# Patient Record
Sex: MALE | Race: WHITE | NOT HISPANIC OR LATINO | Employment: FULL TIME | URBAN - METROPOLITAN AREA
[De-identification: names, ages, dates, MRNs, and addresses within clinical notes are randomized per-mention and may not be internally consistent; named-entity substitution may affect disease eponyms.]

---

## 2021-09-07 ENCOUNTER — OCCUPATIONAL HEALTH (OUTPATIENT)
Dept: URGENT CARE | Facility: CLINIC | Age: 41
End: 2021-09-07

## 2021-09-07 DIAGNOSIS — J02.9 SORE THROAT: ICD-10-CM

## 2021-09-07 DIAGNOSIS — J02.9 SORE THROAT: Primary | ICD-10-CM

## 2021-09-07 LAB
CTP QC/QA: YES
SARS-COV-2 RDRP RESP QL NAA+PROBE: NEGATIVE

## 2021-09-07 PROCEDURE — U0002 COVID-19 LAB TEST NON-CDC: HCPCS | Mod: QW,S$GLB,, | Performed by: PREVENTIVE MEDICINE

## 2021-09-07 PROCEDURE — U0002: ICD-10-PCS | Mod: QW,S$GLB,, | Performed by: PREVENTIVE MEDICINE

## 2021-10-04 ENCOUNTER — IMMUNIZATION (OUTPATIENT)
Dept: PRIMARY CARE CLINIC | Facility: CLINIC | Age: 41
End: 2021-10-04
Payer: OTHER GOVERNMENT

## 2021-10-04 DIAGNOSIS — Z23 NEED FOR VACCINATION: Primary | ICD-10-CM

## 2021-10-04 PROCEDURE — 91300 COVID-19, MRNA, LNP-S, PF, 30 MCG/0.3 ML DOSE VACCINE: CPT | Mod: S$GLB,,, | Performed by: FAMILY MEDICINE

## 2021-10-04 PROCEDURE — 0001A COVID-19, MRNA, LNP-S, PF, 30 MCG/0.3 ML DOSE VACCINE: ICD-10-PCS | Mod: S$GLB,,, | Performed by: FAMILY MEDICINE

## 2021-10-04 PROCEDURE — 91300 COVID-19, MRNA, LNP-S, PF, 30 MCG/0.3 ML DOSE VACCINE: ICD-10-PCS | Mod: S$GLB,,, | Performed by: FAMILY MEDICINE

## 2021-10-04 PROCEDURE — 0001A COVID-19, MRNA, LNP-S, PF, 30 MCG/0.3 ML DOSE VACCINE: CPT | Mod: S$GLB,,, | Performed by: FAMILY MEDICINE

## 2021-10-25 ENCOUNTER — IMMUNIZATION (OUTPATIENT)
Dept: PRIMARY CARE CLINIC | Facility: CLINIC | Age: 41
End: 2021-10-25
Payer: OTHER GOVERNMENT

## 2021-10-25 DIAGNOSIS — Z23 NEED FOR VACCINATION: Primary | ICD-10-CM

## 2021-10-25 PROCEDURE — 91300 COVID-19, MRNA, LNP-S, PF, 30 MCG/0.3 ML DOSE VACCINE: CPT | Mod: S$GLB,,, | Performed by: FAMILY MEDICINE

## 2021-10-25 PROCEDURE — 0002A COVID-19, MRNA, LNP-S, PF, 30 MCG/0.3 ML DOSE VACCINE: CPT | Mod: S$GLB,,, | Performed by: FAMILY MEDICINE

## 2021-10-25 PROCEDURE — 0002A COVID-19, MRNA, LNP-S, PF, 30 MCG/0.3 ML DOSE VACCINE: ICD-10-PCS | Mod: S$GLB,,, | Performed by: FAMILY MEDICINE

## 2021-10-25 PROCEDURE — 91300 COVID-19, MRNA, LNP-S, PF, 30 MCG/0.3 ML DOSE VACCINE: ICD-10-PCS | Mod: S$GLB,,, | Performed by: FAMILY MEDICINE

## 2021-12-28 ENCOUNTER — OCCUPATIONAL HEALTH (OUTPATIENT)
Dept: URGENT CARE | Facility: CLINIC | Age: 41
End: 2021-12-28
Payer: OTHER GOVERNMENT

## 2021-12-28 DIAGNOSIS — R09.81 SINUS CONGESTION: ICD-10-CM

## 2021-12-28 DIAGNOSIS — R68.83 CHILLS: ICD-10-CM

## 2021-12-28 DIAGNOSIS — R53.83 FATIGUE, UNSPECIFIED TYPE: ICD-10-CM

## 2021-12-28 DIAGNOSIS — R09.89 RUNNY NOSE: Primary | ICD-10-CM

## 2021-12-28 DIAGNOSIS — R09.89 RUNNY NOSE: ICD-10-CM

## 2021-12-28 DIAGNOSIS — R50.9 FEVER, UNSPECIFIED FEVER CAUSE: ICD-10-CM

## 2021-12-28 LAB
CTP QC/QA: YES
SARS-COV-2 RDRP RESP QL NAA+PROBE: NEGATIVE

## 2021-12-28 PROCEDURE — U0002: ICD-10-PCS | Mod: QW,S$GLB,, | Performed by: PREVENTIVE MEDICINE

## 2021-12-28 PROCEDURE — U0002 COVID-19 LAB TEST NON-CDC: HCPCS | Mod: QW,S$GLB,, | Performed by: PREVENTIVE MEDICINE

## 2022-02-23 ENCOUNTER — DOCUMENTATION ONLY (OUTPATIENT)
Dept: ADMINISTRATIVE | Facility: HOSPITAL | Age: 42
End: 2022-02-23
Payer: OTHER GOVERNMENT

## 2022-02-23 LAB
CTP QC/QA: YES
SARS-COV-2 AG RESP QL IA.RAPID: NEGATIVE

## 2022-07-06 ENCOUNTER — DOCUMENTATION ONLY (OUTPATIENT)
Dept: FAMILY MEDICINE | Facility: CLINIC | Age: 42
End: 2022-07-06

## 2022-07-06 DIAGNOSIS — R11.0 NAUSEA: Primary | ICD-10-CM

## 2022-07-06 LAB
CTP QC/QA: YES
SARS-COV-2 AG RESP QL IA.RAPID: POSITIVE

## 2022-11-29 ENCOUNTER — DOCUMENTATION ONLY (OUTPATIENT)
Dept: ADMINISTRATIVE | Facility: HOSPITAL | Age: 42
End: 2022-11-29

## 2022-11-29 LAB
CTP QC/QA: YES
SARS-COV-2 AG RESP QL IA.RAPID: NEGATIVE

## 2023-07-19 ENCOUNTER — HOSPITAL ENCOUNTER (EMERGENCY)
Facility: HOSPITAL | Age: 43
Discharge: HOME | End: 2023-07-19
Attending: EMERGENCY MEDICINE
Payer: OTHER GOVERNMENT

## 2023-07-19 VITALS
DIASTOLIC BLOOD PRESSURE: 85 MMHG | HEIGHT: 69 IN | RESPIRATION RATE: 16 BRPM | WEIGHT: 170 LBS | OXYGEN SATURATION: 95 % | TEMPERATURE: 98 F | BODY MASS INDEX: 25.18 KG/M2 | SYSTOLIC BLOOD PRESSURE: 125 MMHG | HEART RATE: 69 BPM

## 2023-07-19 DIAGNOSIS — G51.0 BELL'S PALSY: Primary | ICD-10-CM

## 2023-07-19 DIAGNOSIS — R07.9 CHEST PAIN, UNSPECIFIED TYPE: ICD-10-CM

## 2023-07-19 PROBLEM — R29.810 FACIAL WEAKNESS: Status: ACTIVE | Noted: 2023-07-19

## 2023-07-19 PROBLEM — R29.810 FACIAL WEAKNESS: Status: RESOLVED | Noted: 2023-07-19 | Resolved: 2023-07-19

## 2023-07-19 PROBLEM — R07.89 CHEST TIGHTNESS: Status: ACTIVE | Noted: 2023-07-19

## 2023-07-19 LAB
ALBUMIN SERPL BCP-MCNC: 4.3 G/DL (ref 3.5–5.2)
ALP SERPL-CCNC: 54 U/L (ref 55–135)
ALT SERPL W/O P-5'-P-CCNC: 12 U/L (ref 10–44)
ANION GAP SERPL CALC-SCNC: 8 MMOL/L (ref 8–16)
AST SERPL-CCNC: 14 U/L (ref 10–40)
BASOPHILS # BLD AUTO: 0.05 K/UL (ref 0–0.2)
BASOPHILS NFR BLD: 0.6 % (ref 0–1.9)
BILIRUB SERPL-MCNC: 0.6 MG/DL (ref 0.1–1)
BNP SERPL-MCNC: 18 PG/ML (ref 0–99)
BUN SERPL-MCNC: 17 MG/DL (ref 6–20)
CALCIUM SERPL-MCNC: 8.8 MG/DL (ref 8.7–10.5)
CHLORIDE SERPL-SCNC: 106 MMOL/L (ref 95–110)
CHOLEST SERPL-MCNC: 175 MG/DL (ref 120–199)
CHOLEST/HDLC SERPL: 3.1 {RATIO} (ref 2–5)
CO2 SERPL-SCNC: 27 MMOL/L (ref 23–29)
CREAT SERPL-MCNC: 1.3 MG/DL (ref 0.5–1.4)
CREAT SERPL-MCNC: 1.4 MG/DL (ref 0.5–1.4)
DIFFERENTIAL METHOD: ABNORMAL
EOSINOPHIL # BLD AUTO: 0.2 K/UL (ref 0–0.5)
EOSINOPHIL NFR BLD: 2.1 % (ref 0–8)
ERYTHROCYTE [DISTWIDTH] IN BLOOD BY AUTOMATED COUNT: 13.3 % (ref 11.5–14.5)
EST. GFR  (NO RACE VARIABLE): >60 ML/MIN/1.73 M^2
GLUCOSE SERPL-MCNC: 87 MG/DL (ref 70–110)
GLUCOSE SERPL-MCNC: 96 MG/DL (ref 70–110)
HCT VFR BLD AUTO: 41.1 % (ref 40–54)
HDLC SERPL-MCNC: 56 MG/DL (ref 40–75)
HDLC SERPL: 32 % (ref 20–50)
HGB BLD-MCNC: 13.3 G/DL (ref 14–18)
IMM GRANULOCYTES # BLD AUTO: 0.02 K/UL (ref 0–0.04)
IMM GRANULOCYTES NFR BLD AUTO: 0.2 % (ref 0–0.5)
INR PPP: 0.9 (ref 0.8–1.2)
LDLC SERPL CALC-MCNC: 60 MG/DL (ref 63–159)
LYMPHOCYTES # BLD AUTO: 2.7 K/UL (ref 1–4.8)
LYMPHOCYTES NFR BLD: 33.5 % (ref 18–48)
MCH RBC QN AUTO: 27.9 PG (ref 27–31)
MCHC RBC AUTO-ENTMCNC: 32.4 G/DL (ref 32–36)
MCV RBC AUTO: 86 FL (ref 82–98)
MONOCYTES # BLD AUTO: 0.6 K/UL (ref 0.3–1)
MONOCYTES NFR BLD: 7.4 % (ref 4–15)
NEUTROPHILS # BLD AUTO: 4.6 K/UL (ref 1.8–7.7)
NEUTROPHILS NFR BLD: 56.2 % (ref 38–73)
NONHDLC SERPL-MCNC: 119 MG/DL
NRBC BLD-RTO: 0 /100 WBC
PLATELET # BLD AUTO: 242 K/UL (ref 150–450)
PMV BLD AUTO: 10.9 FL (ref 9.2–12.9)
POTASSIUM SERPL-SCNC: 3.7 MMOL/L (ref 3.5–5.1)
PROT SERPL-MCNC: 7.1 G/DL (ref 6–8.4)
PROTHROMBIN TIME: 10.1 SEC (ref 9–12.5)
RBC # BLD AUTO: 4.76 M/UL (ref 4.6–6.2)
SAMPLE: NORMAL
SODIUM SERPL-SCNC: 141 MMOL/L (ref 136–145)
TRIGL SERPL-MCNC: 295 MG/DL (ref 30–150)
TROPONIN I SERPL HS-MCNC: <2.3 PG/ML (ref 0–14.9)
TROPONIN I SERPL HS-MCNC: <2.3 PG/ML (ref 0–14.9)
TSH SERPL DL<=0.005 MIU/L-ACNC: 2.17 UIU/ML (ref 0.34–5.6)
WBC # BLD AUTO: 8.12 K/UL (ref 3.9–12.7)

## 2023-07-19 PROCEDURE — 85025 COMPLETE CBC W/AUTO DIFF WBC: CPT | Performed by: EMERGENCY MEDICINE

## 2023-07-19 PROCEDURE — 93010 ELECTROCARDIOGRAM REPORT: CPT | Mod: ,,, | Performed by: INTERNAL MEDICINE

## 2023-07-19 PROCEDURE — 83880 ASSAY OF NATRIURETIC PEPTIDE: CPT | Performed by: EMERGENCY MEDICINE

## 2023-07-19 PROCEDURE — 93005 ELECTROCARDIOGRAM TRACING: CPT | Performed by: INTERNAL MEDICINE

## 2023-07-19 PROCEDURE — 84443 ASSAY THYROID STIM HORMONE: CPT | Performed by: EMERGENCY MEDICINE

## 2023-07-19 PROCEDURE — 25500020 PHARM REV CODE 255: Performed by: EMERGENCY MEDICINE

## 2023-07-19 PROCEDURE — 84484 ASSAY OF TROPONIN QUANT: CPT | Performed by: EMERGENCY MEDICINE

## 2023-07-19 PROCEDURE — 82962 GLUCOSE BLOOD TEST: CPT

## 2023-07-19 PROCEDURE — 63600175 PHARM REV CODE 636 W HCPCS: Performed by: EMERGENCY MEDICINE

## 2023-07-19 PROCEDURE — 80053 COMPREHEN METABOLIC PANEL: CPT | Performed by: EMERGENCY MEDICINE

## 2023-07-19 PROCEDURE — 25000003 PHARM REV CODE 250: Performed by: STUDENT IN AN ORGANIZED HEALTH CARE EDUCATION/TRAINING PROGRAM

## 2023-07-19 PROCEDURE — G0408 PR TELHEALTH INPT CONSULT 35MIN: ICD-10-PCS | Mod: GT,,, | Performed by: STUDENT IN AN ORGANIZED HEALTH CARE EDUCATION/TRAINING PROGRAM

## 2023-07-19 PROCEDURE — 80061 LIPID PANEL: CPT | Performed by: EMERGENCY MEDICINE

## 2023-07-19 PROCEDURE — 93010 EKG 12-LEAD: ICD-10-PCS | Mod: ,,, | Performed by: INTERNAL MEDICINE

## 2023-07-19 PROCEDURE — 93005 ELECTROCARDIOGRAM TRACING: CPT | Performed by: SPECIALIST

## 2023-07-19 PROCEDURE — 25000003 PHARM REV CODE 250: Performed by: EMERGENCY MEDICINE

## 2023-07-19 PROCEDURE — 99285 EMERGENCY DEPT VISIT HI MDM: CPT | Mod: 25

## 2023-07-19 PROCEDURE — 85610 PROTHROMBIN TIME: CPT | Performed by: EMERGENCY MEDICINE

## 2023-07-19 PROCEDURE — G0408 INPT/TELE FOLLOW UP 35: HCPCS | Mod: GT,,, | Performed by: STUDENT IN AN ORGANIZED HEALTH CARE EDUCATION/TRAINING PROGRAM

## 2023-07-19 RX ORDER — METHYLPREDNISOLONE 4 MG/1
TABLET ORAL
Qty: 21 EACH | Refills: 0 | Status: SHIPPED | OUTPATIENT
Start: 2023-07-19 | End: 2023-08-09

## 2023-07-19 RX ORDER — VALACYCLOVIR HYDROCHLORIDE 1 G/1
1000 TABLET, FILM COATED ORAL 3 TIMES DAILY
Qty: 21 TABLET | Refills: 0 | Status: SHIPPED | OUTPATIENT
Start: 2023-07-19 | End: 2023-07-26

## 2023-07-19 RX ORDER — ASPIRIN 325 MG
325 TABLET ORAL
Status: COMPLETED | OUTPATIENT
Start: 2023-07-19 | End: 2023-07-19

## 2023-07-19 RX ORDER — VALACYCLOVIR HYDROCHLORIDE 500 MG/1
1000 TABLET, FILM COATED ORAL 3 TIMES DAILY
Status: DISCONTINUED | OUTPATIENT
Start: 2023-07-19 | End: 2023-07-20 | Stop reason: HOSPADM

## 2023-07-19 RX ORDER — PREDNISONE 20 MG/1
60 TABLET ORAL
Status: COMPLETED | OUTPATIENT
Start: 2023-07-19 | End: 2023-07-19

## 2023-07-19 RX ADMIN — ASPIRIN 325 MG: 325 TABLET ORAL at 09:07

## 2023-07-19 RX ADMIN — VALACYCLOVIR 1000 MG: 500 TABLET, FILM COATED ORAL at 11:07

## 2023-07-19 RX ADMIN — IOHEXOL 100 ML: 350 INJECTION, SOLUTION INTRAVENOUS at 07:07

## 2023-07-19 RX ADMIN — PREDNISONE 60 MG: 20 TABLET ORAL at 09:07

## 2023-07-20 NOTE — HPI
Patient is a 43-year-old male with no significant past medical history who presents with left-sided facial droop and dysarthria.  Symptoms began suddenly earlier this evening 30 minutes prior to ED arrival.  Did initially have some associated chest tightness.  Denies shortness of breath.  Denies headaches, lightheadedness, changes in vision, and any weakness.  Denies any history of strokes or MIs.  No family history of strokes.  No fevers or chills.  CBC and CMP unremarkable.  Troponins x2 are negative.  EKG without ST elevations.  MRI brain no acute findings.  Neurology consulted and believes symptoms are due to Bell's palsy.  Patient given Valtrex and prednisone.  On my examination, chest tightness has resolved.  Symptoms have now stabilized and patient feels well enough to go home.  Patient feels comfortable with following up outpatient with Neurology.

## 2023-07-20 NOTE — CONSULTS
[facility] - Emergency Dept  Hospital Medicine  Consult Note    Patient Name: Daryl Tsai  MRN: [id number]  Admission Date: 7/19/2023  Hospital Length of Stay: 0 days  Attending Physician: No att. providers found   Primary Care Provider: [facility]           Patient information was obtained from patient and ER records.     Consults  Subjective:     Principal Problem: Bell's palsy    Chief Complaint:   Chief Complaint   Patient presents with    Chest Pain    Facial Droop    Cerebrovascular Accident        HPI: Patient is a 43-year-old male with no significant past medical history who presents with left-sided facial droop and dysarthria.  Symptoms began suddenly earlier this evening 30 minutes prior to ED arrival.  Did initially have some associated chest tightness.  Denies shortness of breath.  Denies headaches, lightheadedness, changes in vision, and any weakness.  Denies any history of strokes or MIs.  No family history of strokes.  No fevers or chills.  CBC and CMP unremarkable.  Troponins x2 are negative.  EKG without ST elevations.  MRI brain no acute findings.  Neurology consulted and believes symptoms are due to Bell's palsy.  Patient given Valtrex and prednisone.  On my examination, chest tightness has resolved.  Symptoms have now stabilized and patient feels well enough to go home.  Patient feels comfortable with following up outpatient with Neurology.      No past medical history on file.    No past surgical history on file.    Review of patient's allergies indicates:  No Known Allergies    No current facility-administered medications on file prior to encounter.     No current outpatient medications on file prior to encounter.     Family History    None       Tobacco Use    Smoking status: Not on file    Smokeless tobacco: Not on file   Substance and Sexual Activity    Alcohol use: Not on file    Drug use: Not on file    Sexual activity: Not on file     Review of  Systems   Constitutional: Negative.    HENT: Negative.     Eyes: Negative.    Respiratory: Negative.     Cardiovascular:  Negative for palpitations and leg swelling.        Chest tightness   Gastrointestinal: Negative.    Endocrine: Negative.    Genitourinary: Negative.    Musculoskeletal: Negative.    Skin: Negative.    Neurological:  Positive for facial asymmetry and speech difficulty. Negative for dizziness, tremors, seizures, syncope, weakness, light-headedness, numbness and headaches.   Hematological: Negative.    Psychiatric/Behavioral: Negative.     Objective:     Vital Signs (Most Recent):  Temp: 97.9 °F (36.6 °C) (07/19/23 1927)  Pulse: 86 (07/19/23 1927)  Resp: 17 (07/19/23 1927)  BP: (!) 170/100 (07/19/23 1927)  SpO2: 97 % (07/19/23 1927) Vital Signs (24h Range):  Temp:  [97.9 °F (36.6 °C)] 97.9 °F (36.6 °C)  Pulse:  [86] 86  Resp:  [17] 17  SpO2:  [97 %] 97 %  BP: (170)/(100) 170/100     Weight: 77.1 kg (170 lb)  Body mass index is 25.1 kg/m².     Physical Exam  Vitals and nursing note reviewed.   Constitutional:       Appearance: Normal appearance.   HENT:      Head: Normocephalic and atraumatic.      Nose: Nose normal.   Eyes:      Extraocular Movements: Extraocular movements intact.      Conjunctiva/sclera: Conjunctivae normal.      Pupils: Pupils are equal, round, and reactive to light.   Cardiovascular:      Rate and Rhythm: Normal rate and regular rhythm.      Heart sounds: No murmur heard.  Pulmonary:      Effort: Pulmonary effort is normal. No respiratory distress.      Breath sounds: Normal breath sounds. No stridor. No wheezing or rales.   Abdominal:      General: Abdomen is flat. There is no distension.      Palpations: Abdomen is soft.      Tenderness: There is no abdominal tenderness.   Musculoskeletal:         General: No swelling, tenderness or deformity. Normal range of motion.      Cervical back: Normal range of motion and neck supple.      Right lower leg: No edema.      Left lower leg:  No edema.   Skin:     General: Skin is warm and dry.      Coloration: Skin is not jaundiced.   Neurological:      Mental Status: He is alert and oriented to person, place, and time.      Sensory: No sensory deficit.      Motor: No weakness.      Coordination: Coordination normal.      Comments: Left facial droop, dysarthria   Psychiatric:         Mood and Affect: Mood normal.         Behavior: Behavior normal.         Thought Content: Thought content normal.         Judgment: Judgment normal.        Significant Labs: All pertinent labs within the past 24 hours have been reviewed.  Recent Lab Results         07/19/23 1918 07/19/23 1911 07/19/23 1908        Albumin   4.3         Alkaline Phosphatase   54         ALT   12         Anion Gap   8         AST   14         Baso #   0.05         Basophil %   0.6         BILIRUBIN TOTAL   0.6  Comment: For infants and newborns, interpretation of results should be based  on gestational age, weight and in agreement with clinical  observations.    Premature Infant recommended reference ranges:  Up to 24 hours.............<8.0 mg/dL  Up to 48 hours............<12.0 mg/dL  3-5 days..................<15.0 mg/dL  6-29 days.................<15.0 mg/dL           BNP   18  Comment: Values of less than 100 pg/ml are consistent with non-CHF populations.         BUN   17         Calcium   8.8         Chloride   106         Cholesterol   175  Comment: The National Cholesterol Education Program (NCEP) has set the  following guidelines (reference ranges) for Cholesterol:  Optimal.....................<200 mg/dL  Borderline High.............200-239 mg/dL  High........................> or = 240 mg/dL           CO2   27         Creatinine   1.3         Differential Method   Automated         eGFR   >60.0         Eos #   0.2         Eosinophil %   2.1         Glucose   96         Gran # (ANC)   4.6         Gran %   56.2         HDL   56  Comment: The National Cholesterol Education Program  (NCEP) has set the  following guidelines (reference values) for HDL Cholesterol:  Low...............<40 mg/dL  Optimal...........>60 mg/dL           HDL/Cholesterol Ratio   32.0         Hematocrit   41.1         Hemoglobin   13.3         Immature Grans (Abs)   0.02  Comment: Mild elevation in immature granulocytes is non specific and   can be seen in a variety of conditions including stress response,   acute inflammation, trauma and pregnancy. Correlation with other   laboratory and clinical findings is essential.           Immature Granulocytes   0.2         INR   0.9  Comment: Coumadin Therapy:  2.0 - 3.0 for INR for all indicators except mechanical heart valves  and antiphospholipid syndromes which should use 2.5 - 3.5.           LDL Cholesterol External   60.0  Comment: The National Cholesterol Education Program (NCEP) has set the  following guidelines (reference values) for LDL Cholesterol:  Optimal.......................<130 mg/dL  Borderline High...............130-159 mg/dL  High..........................160-189 mg/dL  Very High.....................>190 mg/dL           Lymph #   2.7         Lymph %   33.5         MCH   27.9         MCHC   32.4         MCV   86         Mono #   0.6         Mono %   7.4         MPV   10.9         Non-HDL Cholesterol   119  Comment: Risk category and Non-HDL cholesterol goals:  Coronary heart disease (CHD)or equivalent (10-year risk of CHD >20%):  Non-HDL cholesterol goal     <130 mg/dL  Two or more CHD risk factors and 10-year risk of CHD <= 20%:  Non-HDL cholesterol goal     <160 mg/dL  0 to 1 CHD risk factor:  Non-HDL cholesterol goal     <190 mg/dL           nRBC   0         Platelets   242         POC Creatinine     1.4       POC Glucose 87           Potassium   3.7         PROTEIN TOTAL   7.1         Protime   10.1         RBC   4.76         RDW   13.3         Sample     VENOUS       Sodium   141         Total Cholesterol/HDL Ratio   3.1         Triglycerides    295  Comment: The National Cholesterol Education Program (NCEP) has set the  following guidelines (reference values) for triglycerides:  Normal......................<150 mg/dL  Borderline High.............150-199 mg/dL  High........................200-499 mg/dL           Troponin I High Sensitivity   <2.3  Comment: Troponin results differ between methods. Do not use   results between Troponin methods interchangeably.    Alkaline Phospatase levels above 400 U/L may   cause false positive results.    Access hsTnI should not be used for patients taking   Asfotase shaun (Strensiq).           TSH   2.170         WBC   8.12                 Significant Imaging: I have reviewed all pertinent imaging results/findings within the past 24 hours.    Assessment/Plan:     * Bell's palsy  MRI brain no acute findings.  Neurology consulted and believes symptoms are due to Bell's palsy.  Patient given Valtrex and prednisone.  We will discharge patient with Medrol Dosepak and Valtrex for 1 week.  Outpatient neurology referral placed.  Patient advised to return to ED for any new or worsening of symptoms.  Patient is in agreement with plan.      Chest tightness  Chest tightness resolved, troponins x2 are negative, EKG without ST elevations      VTE Risk Mitigation (From admission, onward)    None              Thank you for your consult. I will sign off. Please contact us if you have any additional questions.    Danish Flores MD  Department of Hospital Medicine   Former[facility] - Emergency Dept

## 2023-07-20 NOTE — HPI
42 y/o male with no significant history who presented with acute onset disorientation (describes it as 'out of body experience'), chest pressure, facial numbness on both sided and slurred speech. No weakness or numbness of the extremities. He also states that he had a hard time closing his left eye when EMS evaluated him.  These symptoms have never occurred before.    /110

## 2023-07-20 NOTE — ED PROVIDER NOTES
Encounter Date: 7/19/2023       History     Chief Complaint   Patient presents with    Chest Pain    Facial Droop    Cerebrovascular Accident     43-year-old male presented emergency department with left facial droop and slurred speech and diffuse facial tingling and left sided chest pain which started 30 minutes prior to arrival.  Patient said patient felt like he could not close his left eye when EMS arrived to the house and by the time patient came here his left facial droop significantly improved and can close the left eye at this time.  Patient's chest pain also resolved.  Patient denies fever or chills or nausea vomiting or dysuria or hematuria.  Patient has no prior history of hypertension or stroke or heart disease.    Review of patient's allergies indicates:  No Known Allergies  No past medical history on file.  No past surgical history on file.  No family history on file.     Review of Systems   Constitutional: Negative.    HENT: Negative.     Eyes: Negative.    Respiratory: Negative.     Cardiovascular:  Positive for chest pain.   Gastrointestinal: Negative.    Endocrine: Negative.    Genitourinary: Negative.    Musculoskeletal: Negative.    Skin: Negative.    Allergic/Immunologic: Negative.    Neurological:  Positive for facial asymmetry and speech difficulty.   Hematological: Negative.    Psychiatric/Behavioral: Negative.     All other systems reviewed and are negative.    Physical Exam     Initial Vitals [07/19/23 1927]   BP Pulse Resp Temp SpO2   (!) 170/100 86 17 97.9 °F (36.6 °C) 97 %      MAP       --         Physical Exam    Nursing note and vitals reviewed.  Constitutional: He appears well-developed and well-nourished.   HENT:   Head: Normocephalic and atraumatic.   Nose: Nose normal.   Eyes: Conjunctivae and EOM are normal.   Neck: No tracheal deviation present.   Normal range of motion.  Cardiovascular:  Normal rate, regular rhythm, normal heart sounds and intact distal pulses.     Exam reveals  no friction rub.       No murmur heard.  Pulmonary/Chest: Breath sounds normal. No respiratory distress. He has no wheezes. He has no rales.   Abdominal: Abdomen is soft. He exhibits no distension. There is no abdominal tenderness.   Musculoskeletal:         General: Normal range of motion.      Cervical back: Normal range of motion.     Neurological: He is alert and oriented to person, place, and time. He has normal strength. He displays normal reflexes. No sensory deficit. GCS score is 15. GCS eye subscore is 4. GCS verbal subscore is 5. GCS motor subscore is 6.   Very mild asymmetry noted on the face with slightly different on both sides of the face.  Patient's speech improved as well.  Left facial lower motor neuron weakness noted   Skin: Skin is warm and dry. Capillary refill takes less than 2 seconds.   Psychiatric: He has a normal mood and affect. Thought content normal.       ED Course   Critical Care    Date/Time: 7/19/2023 7:32 PM  Performed by: Susan Wu MD  Authorized by: Susan Wu MD   Direct patient critical care time: 20 minutes  Documentation critical care time: 5 minutes  Consulting other physicians critical care time: 5 minutes  Total critical care time (exclusive of procedural time) : 30 minutes      Labs Reviewed   CBC W/ AUTO DIFFERENTIAL - Abnormal; Notable for the following components:       Result Value    Hemoglobin 13.3 (*)     All other components within normal limits   COMPREHENSIVE METABOLIC PANEL - Abnormal; Notable for the following components:    Alkaline Phosphatase 54 (*)     All other components within normal limits   LIPID PANEL - Abnormal; Notable for the following components:    Triglycerides 295 (*)     LDL Cholesterol 60.0 (*)     All other components within normal limits   PROTIME-INR   TSH   TROPONIN I HIGH SENSITIVITY   B-TYPE NATRIURETIC PEPTIDE   TROPONIN I HIGH SENSITIVITY   ISTAT CREATININE   POCT GLUCOSE   POCT GLUCOSE MONITORING CONTINUOUS   POCT CREATININE      EKG Readings: (Independently Interpreted)   Initial Reading: No STEMI. Rhythm: Normal Sinus Rhythm. Ectopy: No Ectopy. Conduction: Normal.     Imaging Results              X-Ray Chest PA And Lateral (Final result)  Result time 07/19/23 20:24:32   Procedure changed from X-Ray Chest AP Portable     Final result by Sher Samuel DO (07/19/23 20:24:32)                   Narrative:    EXAM DESCRIPTION: XR CHEST PA AND LATERAL    CLINICAL HISTORY:43 years Male, CHF    Comparison: None    FINDINGS:  No focal lung consolidation.  No pleural effusion. No pneumothorax.  Cardiomediastinal silhouette is within normal limits.  No acute osseous abnormality.    IMPRESSION:    No acute cardiopulmonary disease.    Electronically signed by:  Sher Samuel DO  7/19/2023 8:24 PM CDT Workstation: [id number]                                     MRI Brain Without Contrast (Final result)  Result time 07/19/23 20:23:46      Final result by Sher Samuel DO (07/19/23 20:23:46)                   Narrative:    EXAM: MRI OF THE BRAIN WITHOUT CONTRAST    CLINICAL HISTORY:43 years Male, Transient ischemic attack (TIA)    Comparison: CT and CTA head of the same day    TECHNIQUE : Multiplanar T1 and T2-weighted multisequence imaging of the brain was performed without the administration of IV contrast.    FINDINGS:  No restricted diffusion. No acute intracranial hemorrhage, extra-axial collection, mass effect or herniation. Ventricular size is within normal limits in size and configuration.  No focal white matter signal abnormality.    Globes and orbits are normal. The sella and other midline structures are within normal limits.    Major intracranial vascular flow voids are present.    Paranasal sinuses are well aerated. Large bilateral mastoid effusions.    IMPRESSION:    No acute intracranial abnormality.    Large bilateral mastoid effusions.    Electronically signed by:  Sher Samuel DO  7/19/2023 8:23 PM CDT  Workstation: [id number]                                     CTA Head and Neck (xpd) (Final result)  Result time 07/19/23 19:38:12      Final result by William Del nAgel MD (07/19/23 19:38:12)                   Narrative:    CMS MANDATED QUALITY DATA - CT RADIATION - 436    All CT scans at this facility use dose modulation, iterative-reconstruction, and/or weight-based dosing when appropriate to reduce radiation dose to as low as reasonably achievable.    CMS MANDATED QUALITY [id number]    NASCET CRITERIA WAS UTILIZED FOR ESTIMATION OF STENOSIS SEVERITY WITH THE NARROWEST SEGMENT BEING COMPARED TO THE DISTAL LUMINAL DIAMETER.        HISTORY:Stroke/TIA. Determined embolic source.    TECHNIQUE: Serial axial images were obtained from aortic arch through the vertex with 100 cc of Omnipaque 350 intravenous contrast utilizing a CT angiography protocol. Coronal and sagittal MIP CT angiography reconstructions were performed. Patient received approximately 1175 mGy-cm of radiation exposure from this exam.    COMPARISON: No previous study for comparison.    FINDINGS:Bovine arch is noted. Cervical carotid and vertebral arteries appear widely patent without evidence of significant atherosclerotic change. Left vertebral artery dominance is present. Basilar artery appears widely patent. Internal carotid arteries are widely patent at the skull base. Bilateral anterior, middle, and posterior cerebral arteries appear patent without evidence of large vessel intracranial arterial occlusion or flow-limiting stenosis.    IMPRESSION:  1. No evidence of cervical carotids/vertebral artery occlusion or significant stenosis seen.  2. No evidence of large vessel intracranial arterial occlusion or significant stenosis seen.          Electronically signed by:  William Del Angel MD  7/19/2023 7:38 PM CDT Workstation: [id number]                                     CT Head Without Contrast (Final result)  Result time 07/19/23 19:26:40       Final result by William Del Angel MD (07/19/23 19:26:40)                   Narrative:    CMS MANDATED QUALITY DATA - CT RADIATION - 436    All CT scans at this facility use dose modulation, iterative-reconstruction, and/or weight-based dosing when appropriate to reduce radiation dose to as low as reasonably achievable.        HISTORY:  Neurologic deficit. Acute stroke suspected.    TECHNIQUE:  CT images were obtained from the skull base to the vertex without the administration of intravenous contrast. Coronal and sagittal reconstructions were performed. The patient received approximate 312 mGy-cm of radiation exposure from this exam.    COMPARISON: No previous study available for comparison.    FINDINGS:  The ventricles and sulci are normal in size and symmetric.  The basal cisterns are patent.  No mass effect or midline shift is seen.  No evidence of acute intracranial hemorrhage, mass, or acute infarct is seen.  The gray/white matter differentiation is preserved.  There are no intra- or extra-axial fluid collections identified.  Paranasal sinuses and mastoid air cells are clear.  Visualized portions of the orbits and osseous structures are unremarkable.    IMPRESSION:    No acute intracranial abnormality seen.          Electronically signed by:  William Del Angel MD  7/19/2023 7:26 PM CDT Workstation: [id number]                                  X-Rays:   Independently Interpreted Readings:   Other Readings:  CT of head and CTA head and neck and MRI brain and chest x-ray all within normal limits  Medications   predniSONE tablet 60 mg (has no administration in time range)   valACYclovir tablet 1,000 mg (has no administration in time range)   aspirin tablet 325 mg (has no administration in time range)   iohexoL (OMNIPAQUE 350) injection 100 mL (100 mLs Intravenous Given 7/19/23 1916)     Medical Decision Making:   Differential Diagnosis:   43-year-old male presented emergency department with slurring of speech which now  is resolved.  Patient said initially could not get his words out but speech normalized.  Left facial droop consistent with Bell's palsy is persistent.  Patient did have paresthesias of the face which resolved.  Extremity strength within normal limit and gait within normal limits.  Patient's workup unremarkable.  Neurologist evaluated patient at bedside and agrees this is Bell's palsy.  MRI brain within normal limits.  Chest pain resolved and screening cardiac workup unremarkable.  I offered to discharge patient as workup unremarkable and patient's presentation consistent with Bell's palsy.  Patient extremely anxious and feels like his speech deficit is coming back and he could not get words out right and getting more anxious and symptomatic and given this Hospital Medicine consulted for further evaluation and management and treatment.  Aspirin given.  Steroid given as recommended by neurologist for treatment of Bell's palsy.  Vision and visual fields intact.  Hospitalist evaluate for further management.  Independently Interpreted Test(s):   I have ordered and independently interpreted X-rays - see prior notes.  I have ordered and independently interpreted EKG Reading(s) - see prior notes  Clinical Tests:   Lab Tests: Reviewed  Radiological Study: Reviewed  Medical Tests: Reviewed  Other:   I have discussed this case with another health care provider.       <> Summary of the Discussion: Hospitalist and neurologist consulted for evaluation and management of patient with left facial droop and speech deficit both of which improved.  Patient's chest pain resolved as well and workup negative                        Clinical Impression:   Final diagnoses:  [G51.0] Bell's palsy (Primary)  [R07.9] Chest pain, unspecified type        ED Disposition Condition    Admit Stable                  Susan Wu MD  07/19/23 2045       Susan Wu MD  07/19/23 2046

## 2023-07-20 NOTE — SUBJECTIVE & OBJECTIVE
No past medical history on file.    No past surgical history on file.    Review of patient's allergies indicates:  No Known Allergies    No current facility-administered medications on file prior to encounter.     No current outpatient medications on file prior to encounter.     Family History    None       Tobacco Use    Smoking status: Not on file    Smokeless tobacco: Not on file   Substance and Sexual Activity    Alcohol use: Not on file    Drug use: Not on file    Sexual activity: Not on file     Review of Systems   Constitutional: Negative.    HENT: Negative.     Eyes: Negative.    Respiratory: Negative.     Cardiovascular:  Negative for palpitations and leg swelling.        Chest tightness   Gastrointestinal: Negative.    Endocrine: Negative.    Genitourinary: Negative.    Musculoskeletal: Negative.    Skin: Negative.    Neurological:  Positive for facial asymmetry and speech difficulty. Negative for dizziness, tremors, seizures, syncope, weakness, light-headedness, numbness and headaches.   Hematological: Negative.    Psychiatric/Behavioral: Negative.     Objective:     Vital Signs (Most Recent):  Temp: 97.9 °F (36.6 °C) (07/19/23 1927)  Pulse: 86 (07/19/23 1927)  Resp: 17 (07/19/23 1927)  BP: (!) 170/100 (07/19/23 1927)  SpO2: 97 % (07/19/23 1927) Vital Signs (24h Range):  Temp:  [97.9 °F (36.6 °C)] 97.9 °F (36.6 °C)  Pulse:  [86] 86  Resp:  [17] 17  SpO2:  [97 %] 97 %  BP: (170)/(100) 170/100     Weight: 77.1 kg (170 lb)  Body mass index is 25.1 kg/m².     Physical Exam  Vitals and nursing note reviewed.   Constitutional:       Appearance: Normal appearance.   HENT:      Head: Normocephalic and atraumatic.      Nose: Nose normal.   Eyes:      Extraocular Movements: Extraocular movements intact.      Conjunctiva/sclera: Conjunctivae normal.      Pupils: Pupils are equal, round, and reactive to light.   Cardiovascular:      Rate and Rhythm: Normal rate and regular rhythm.      Heart sounds: No murmur  heard.  Pulmonary:      Effort: Pulmonary effort is normal. No respiratory distress.      Breath sounds: Normal breath sounds. No stridor. No wheezing or rales.   Abdominal:      General: Abdomen is flat. There is no distension.      Palpations: Abdomen is soft.      Tenderness: There is no abdominal tenderness.   Musculoskeletal:         General: No swelling, tenderness or deformity. Normal range of motion.      Cervical back: Normal range of motion and neck supple.      Right lower leg: No edema.      Left lower leg: No edema.   Skin:     General: Skin is warm and dry.      Coloration: Skin is not jaundiced.   Neurological:      Mental Status: He is alert and oriented to person, place, and time.      Sensory: No sensory deficit.      Motor: No weakness.      Coordination: Coordination normal.      Comments: Left facial droop, dysarthria   Psychiatric:         Mood and Affect: Mood normal.         Behavior: Behavior normal.         Thought Content: Thought content normal.         Judgment: Judgment normal.        Significant Labs: All pertinent labs within the past 24 hours have been reviewed.  Recent Lab Results         07/19/23 1918 07/19/23 1911 07/19/23 1908        Albumin   4.3         Alkaline Phosphatase   54         ALT   12         Anion Gap   8         AST   14         Baso #   0.05         Basophil %   0.6         BILIRUBIN TOTAL   0.6  Comment: For infants and newborns, interpretation of results should be based  on gestational age, weight and in agreement with clinical  observations.    Premature Infant recommended reference ranges:  Up to 24 hours.............<8.0 mg/dL  Up to 48 hours............<12.0 mg/dL  3-5 days..................<15.0 mg/dL  6-29 days.................<15.0 mg/dL           BNP   18  Comment: Values of less than 100 pg/ml are consistent with non-CHF populations.         BUN   17         Calcium   8.8         Chloride   106         Cholesterol   175  Comment: The National  Cholesterol Education Program (NCEP) has set the  following guidelines (reference ranges) for Cholesterol:  Optimal.....................<200 mg/dL  Borderline High.............200-239 mg/dL  High........................> or = 240 mg/dL           CO2   27         Creatinine   1.3         Differential Method   Automated         eGFR   >60.0         Eos #   0.2         Eosinophil %   2.1         Glucose   96         Gran # (ANC)   4.6         Gran %   56.2         HDL   56  Comment: The National Cholesterol Education Program (NCEP) has set the  following guidelines (reference values) for HDL Cholesterol:  Low...............<40 mg/dL  Optimal...........>60 mg/dL           HDL/Cholesterol Ratio   32.0         Hematocrit   41.1         Hemoglobin   13.3         Immature Grans (Abs)   0.02  Comment: Mild elevation in immature granulocytes is non specific and   can be seen in a variety of conditions including stress response,   acute inflammation, trauma and pregnancy. Correlation with other   laboratory and clinical findings is essential.           Immature Granulocytes   0.2         INR   0.9  Comment: Coumadin Therapy:  2.0 - 3.0 for INR for all indicators except mechanical heart valves  and antiphospholipid syndromes which should use 2.5 - 3.5.           LDL Cholesterol External   60.0  Comment: The National Cholesterol Education Program (NCEP) has set the  following guidelines (reference values) for LDL Cholesterol:  Optimal.......................<130 mg/dL  Borderline High...............130-159 mg/dL  High..........................160-189 mg/dL  Very High.....................>190 mg/dL           Lymph #   2.7         Lymph %   33.5         MCH   27.9         MCHC   32.4         MCV   86         Mono #   0.6         Mono %   7.4         MPV   10.9         Non-HDL Cholesterol   119  Comment: Risk category and Non-HDL cholesterol goals:  Coronary heart disease (CHD)or equivalent (10-year risk of CHD >20%):  Non-HDL  cholesterol goal     <130 mg/dL  Two or more CHD risk factors and 10-year risk of CHD <= 20%:  Non-HDL cholesterol goal     <160 mg/dL  0 to 1 CHD risk factor:  Non-HDL cholesterol goal     <190 mg/dL           nRBC   0         Platelets   242         POC Creatinine     1.4       POC Glucose 87           Potassium   3.7         PROTEIN TOTAL   7.1         Protime   10.1         RBC   4.76         RDW   13.3         Sample     VENOUS       Sodium   141         Total Cholesterol/HDL Ratio   3.1         Triglycerides   295  Comment: The National Cholesterol Education Program (NCEP) has set the  following guidelines (reference values) for triglycerides:  Normal......................<150 mg/dL  Borderline High.............150-199 mg/dL  High........................200-499 mg/dL           Troponin I High Sensitivity   <2.3  Comment: Troponin results differ between methods. Do not use   results between Troponin methods interchangeably.    Alkaline Phospatase levels above 400 U/L may   cause false positive results.    Access hsTnI should not be used for patients taking   Asfotase shaun (Strensiq).           TSH   2.170         WBC   8.12                 Significant Imaging: I have reviewed all pertinent imaging results/findings within the past 24 hours.

## 2023-07-20 NOTE — CONSULTS
[facility]  Vascular Neurology  Comprehensive Stroke Center  TeleVascular Neurology Acute Consultation Note      Consult to Telemedicine - Acute Stroke  Consult performed by: Dominick Schwab MD  Consult ordered by: Susan Esparza MD        Consulting Provider: SUSAN ESPARZA  Current Providers  No providers found    Patient Location:  [facility] EMERGENCY DEPARTMENT Emergency Department  Spoke hospital nurse at bedside with patient assisting consultant.     Patient information was obtained from patient and ER records.         Assessment/Plan:       Diagnoses:   Neuro  Facial weakness  42 y/o male with no significant history who presented with acute onset disorientation (describes it as 'out of body experience'), chest pressure, facial numbness on both sided and slurred speech.  Exam notable for left facial weakness with a LMN pattern.    CT head showed no acute changes.  MRI brain shows no acute infarct    Recommendartions:  -- Likely left [place]' palsy  -- Recommend Medrol dose pack and 1 week course of Valtrex (1g Tid x 7days)  -- No further neuroimaging needed.          STROKE DOCUMENTATION     Acute Stroke Times:   Acute Stroke Times   Last Known Normal Date: 07/19/23  Last Known Normal Time: 1630  Symptom Onset Date: 07/19/23  Symptom Onset Time: 1815  Stroke Team Called Date: 07/19/23  Stroke Team Called Time: 1909  Stroke Team Arrival Date: 07/19/23  CT Interpretation Time: 1915  Thrombolytic Therapy Recommended: No    NIH Scale:  1a. Level of Consciousness: 0-->Alert, keenly responsive  1b. LOC Questions: 0-->Answers both questions correctly  1c. LOC Commands: 0-->Performs both tasks correctly  2. Best Gaze: 0-->Normal  3. Visual: 0-->No visual loss  4. Facial Palsy: 2-->Partial paralysis (total or near-total paralysis of lower face)  5a. Motor Arm, Left: 0-->No drift, limb holds 90 (or 45) degrees for full 10 secs  5b. Motor Arm, Right: 0-->No drift, limb holds 90 (or 45)  "degrees for full 10 secs  6a. Motor Leg, Left: 0-->No drift, leg holds 30 degree position for full 5 secs  6b. Motor Leg, Right: 0-->No drift, leg holds 30 degree position for full 5 secs  7. Limb Ataxia: 0-->Absent  8. Sensory: 0-->Normal, no sensory loss  9. Best Language: 0-->No aphasia, normal  10. Dysarthria: 0-->Normal  11. Extinction and Inattention (formerly Neglect): 0-->No abnormality  Total (NIH Stroke Scale): 2     Modified [place]u Score: 0  Jessy Coma Scale:    ABCD2 Score:    [id number] Score:   HAS -BLED Score:   ICH Score:   Hunt & Wiley Classification:       Blood pressure (!) 170/100, pulse 86, temperature 97.9 °F (36.6 °C), temperature source Oral, resp. rate 17, height 5' 9" (1.753 m), weight 77.1 kg (170 lb), SpO2 97 %.  Eligible for thrombolytic therapy?: Yes  Thrombolytic therapy recomended: Thrombolytic therapy not recommended due to Suspected stroke mimic  and Mild Non-Disabling Symptoms  Possible Interventional Revascularization Candidate? No; at this time symptoms not suggestive of large vessel occlusion    Disposition Recommendation: discharge from ED    Subjective:     History of Present Illness:  44 y/o male with no significant history who presented with acute onset disorientation (describes it as 'out of body experience'), chest pressure, facial numbness on both sided and slurred speech. No weakness or numbness of the extremities. He also states that he had a hard time closing his left eye when EMS evaluated him.  These symptoms have never occurred before.    /110        Woke up with symptoms?: No    Recent bleeding noted: no  Does the patient take any Blood Thinners? no  Medications: No relevant medications      Past Medical History: no relevant history    Past Surgical History: none    Family History: no relevant history    Social History: no smoking, no drinking, no drugs    Allergies: No Known Allergies No relevant allergies    Review of Systems   Respiratory: " "Positive for chest tightness.    Neurological: Positive for speech difficulty and numbness.   Psychiatric/Behavioral: Positive for confusion.     Objective:   Vitals: Blood pressure (!) 170/100, pulse 86, temperature 97.9 °F (36.6 °C), temperature source Oral, resp. rate 17, height 5' 9" (1.753 m), weight 77.1 kg (170 lb), SpO2 97 %.     CT READ: Yes  No hemmorhage. No mass effect. No early infarct signs.     Physical Exam  Neurological:      Mental Status: He is alert and oriented to person, place, and time.      Cranial Nerves: Facial asymmetry present. No dysarthria.      Sensory: No sensory deficit.      Motor: No weakness.      Coordination: Finger-Nose-Finger Test normal.      Comments: Decreased activation of the left face, involving the orbicularis oris and frontalis. Orbicularis oculi appears strong.   No evidence of decreased blink rate.             Recommended the emergency room physician to have a brief discussion with the patient and/or family if available regarding the  risks and benefits of treatment, and to briefly document the occurrence of that discussion in his clinical encounter note.     The attending portion of this evaluation, treatment, and documentation was performed per Dominick Schwab MD via audiovisual.    Billing code:  (non-stroke, some mimics)    This patient has neurological symptom(s)/condition/illness, with minimal potential for morbidity and mortality.  There is a low probability for acute neurological change leading to clinical and possibly life-threatening deterioration requiring highest level of physician preparedness for urgent intervention.  Care was coordinated with other physicians involved in the patient's care.  Radiologic studies and laboratory data were reviewed and interpreted, and plan of care was re-assessed based on the results.  Diagnosis, treatment options and prognosis may have been discussed with the patient and/or family members or caregiver.    In " your opinion, this was a: Tier 1 Van Negative    Consult End Time: 7:53 PM     Dominick Schwab MD  Comprehensive Stroke Center  Vascular Neurology   [facility]

## 2023-07-20 NOTE — ASSESSMENT & PLAN NOTE
MRI brain no acute findings.  Neurology consulted and believes symptoms are due to Bell's palsy.  Patient given Valtrex and prednisone.  We will discharge patient with Medrol Dosepak and Valtrex for 1 week.  Outpatient neurology referral placed.  Patient advised to return to ED for any new or worsening of symptoms.  Patient is in agreement with plan.

## 2023-07-20 NOTE — PHARMACY MED REC
"              .      Admission Medication History     The home medication history was taken by Dea Tejada.    You may go to "Admission" then "Reconcile Home Medications" tabs to review and/or act upon these items.     The home medication list has been updated by the Pharmacy department.   Please read ALL comments highlighted in yellow.   Please address this information as you see fit.    Feel free to contact us if you have any questions or require assistance.        Medications listed below were obtained from: Patient/family and Analytic software- [employer]  No current facility-administered medications on file prior to encounter.     No current outpatient medications on file prior to encounter.         Dea Tejada  EXT 1924      "

## 2023-07-20 NOTE — SUBJECTIVE & OBJECTIVE
"  Woke up with symptoms?: No    Recent bleeding noted: no  Does the patient take any Blood Thinners? no  Medications: No relevant medications      Past Medical History: no relevant history    Past Surgical History: none    Family History: no relevant history    Social History: no smoking, no drinking, no drugs    Allergies: No Known Allergies No relevant allergies    Review of Systems   Respiratory: Positive for chest tightness.    Neurological: Positive for speech difficulty and numbness.   Psychiatric/Behavioral: Positive for confusion.     Objective:   Vitals: Blood pressure (!) 170/100, pulse 86, temperature 97.9 °F (36.6 °C), temperature source Oral, resp. rate 17, height 5' 9" (1.753 m), weight 77.1 kg (170 lb), SpO2 97 %.     CT READ: Yes  No hemmorhage. No mass effect. No early infarct signs.     Physical Exam  Neurological:      Mental Status: He is alert and oriented to person, place, and time.      Cranial Nerves: Facial asymmetry present. No dysarthria.      Sensory: No sensory deficit.      Motor: No weakness.      Coordination: Finger-Nose-Finger Test normal.      Comments: Decreased activation of the left face, involving the orbicularis oris and frontalis. Orbicularis oculi appears strong.   No evidence of decreased blink rate.           "

## 2023-07-20 NOTE — ASSESSMENT & PLAN NOTE
42 y/o male with no significant history who presented with acute onset disorientation (describes it as 'out of body experience'), chest pressure, facial numbness on both sided and slurred speech.  Exam notable for left facial weakness with a LMN pattern.    CT head showed no acute changes.  MRI brain shows no acute infarct    Recommendartions:  -- Likely left [place]' palsy  -- Recommend Medrol dose pack and 1 week course of Valtrex (1g Tid x 7days)  -- No further neuroimaging needed.

## 2023-07-25 ENCOUNTER — HOSPITAL ENCOUNTER (EMERGENCY)
Facility: HOSPITAL | Age: 43
Discharge: HOME | End: 2023-07-25
Attending: EMERGENCY MEDICINE
Payer: OTHER GOVERNMENT

## 2023-07-25 VITALS
RESPIRATION RATE: 18 BRPM | TEMPERATURE: 98 F | OXYGEN SATURATION: 99 % | HEIGHT: 69 IN | BODY MASS INDEX: 25.92 KG/M2 | HEART RATE: 91 BPM | WEIGHT: 175 LBS | DIASTOLIC BLOOD PRESSURE: 98 MMHG | SYSTOLIC BLOOD PRESSURE: 140 MMHG

## 2023-07-25 DIAGNOSIS — R07.9 CHEST PAIN: Primary | ICD-10-CM

## 2023-07-25 DIAGNOSIS — R20.2 PARESTHESIAS: ICD-10-CM

## 2023-07-25 LAB
ALBUMIN SERPL BCP-MCNC: 4.1 G/DL (ref 3.5–5.2)
ALP SERPL-CCNC: 49 U/L (ref 55–135)
ALT SERPL W/O P-5'-P-CCNC: 14 U/L (ref 10–44)
ANION GAP SERPL CALC-SCNC: 7 MMOL/L (ref 8–16)
AST SERPL-CCNC: 16 U/L (ref 10–40)
BASOPHILS # BLD AUTO: 0.02 K/UL (ref 0–0.2)
BASOPHILS NFR BLD: 0.3 % (ref 0–1.9)
BILIRUB SERPL-MCNC: 1 MG/DL (ref 0.1–1)
BNP SERPL-MCNC: 9 PG/ML (ref 0–99)
BUN SERPL-MCNC: 11 MG/DL (ref 6–20)
CALCIUM SERPL-MCNC: 8.9 MG/DL (ref 8.7–10.5)
CHLORIDE SERPL-SCNC: 105 MMOL/L (ref 95–110)
CO2 SERPL-SCNC: 26 MMOL/L (ref 23–29)
CREAT SERPL-MCNC: 1.1 MG/DL (ref 0.5–1.4)
DIFFERENTIAL METHOD: ABNORMAL
EOSINOPHIL # BLD AUTO: 0 K/UL (ref 0–0.5)
EOSINOPHIL NFR BLD: 0.4 % (ref 0–8)
ERYTHROCYTE [DISTWIDTH] IN BLOOD BY AUTOMATED COUNT: 13.4 % (ref 11.5–14.5)
EST. GFR  (NO RACE VARIABLE): >60 ML/MIN/1.73 M^2
GLUCOSE SERPL-MCNC: 124 MG/DL (ref 70–110)
HCT VFR BLD AUTO: 41.6 % (ref 40–54)
HGB BLD-MCNC: 14.1 G/DL (ref 14–18)
IMM GRANULOCYTES # BLD AUTO: 0.05 K/UL (ref 0–0.04)
IMM GRANULOCYTES NFR BLD AUTO: 0.7 % (ref 0–0.5)
LYMPHOCYTES # BLD AUTO: 1 K/UL (ref 1–4.8)
LYMPHOCYTES NFR BLD: 13.2 % (ref 18–48)
MAGNESIUM SERPL-MCNC: 2.1 MG/DL (ref 1.6–2.6)
MCH RBC QN AUTO: 28.6 PG (ref 27–31)
MCHC RBC AUTO-ENTMCNC: 33.9 G/DL (ref 32–36)
MCV RBC AUTO: 84 FL (ref 82–98)
MONOCYTES # BLD AUTO: 0.2 K/UL (ref 0.3–1)
MONOCYTES NFR BLD: 2.8 % (ref 4–15)
NEUTROPHILS # BLD AUTO: 6.2 K/UL (ref 1.8–7.7)
NEUTROPHILS NFR BLD: 82.6 % (ref 38–73)
NRBC BLD-RTO: 0 /100 WBC
PLATELET # BLD AUTO: 243 K/UL (ref 150–450)
PMV BLD AUTO: 10.9 FL (ref 9.2–12.9)
POTASSIUM SERPL-SCNC: 4.3 MMOL/L (ref 3.5–5.1)
PROT SERPL-MCNC: 7 G/DL (ref 6–8.4)
RBC # BLD AUTO: 4.93 M/UL (ref 4.6–6.2)
SODIUM SERPL-SCNC: 138 MMOL/L (ref 136–145)
TROPONIN I SERPL HS-MCNC: <2.3 PG/ML (ref 0–14.9)
TROPONIN I SERPL HS-MCNC: <2.3 PG/ML (ref 0–14.9)
WBC # BLD AUTO: 7.51 K/UL (ref 3.9–12.7)

## 2023-07-25 PROCEDURE — 80053 COMPREHEN METABOLIC PANEL: CPT | Performed by: EMERGENCY MEDICINE

## 2023-07-25 PROCEDURE — 84484 ASSAY OF TROPONIN QUANT: CPT | Mod: 91 | Performed by: EMERGENCY MEDICINE

## 2023-07-25 PROCEDURE — 85025 COMPLETE CBC W/AUTO DIFF WBC: CPT | Performed by: EMERGENCY MEDICINE

## 2023-07-25 PROCEDURE — 83880 ASSAY OF NATRIURETIC PEPTIDE: CPT | Performed by: EMERGENCY MEDICINE

## 2023-07-25 PROCEDURE — 83735 ASSAY OF MAGNESIUM: CPT | Performed by: EMERGENCY MEDICINE

## 2023-07-25 PROCEDURE — 99285 EMERGENCY DEPT VISIT HI MDM: CPT | Mod: 25

## 2023-07-25 PROCEDURE — 93010 ELECTROCARDIOGRAM REPORT: CPT | Mod: ,,, | Performed by: SPECIALIST

## 2023-07-25 PROCEDURE — 93010 EKG 12-LEAD: ICD-10-PCS | Mod: ,,, | Performed by: SPECIALIST

## 2023-07-25 PROCEDURE — 25000003 PHARM REV CODE 250: Performed by: EMERGENCY MEDICINE

## 2023-07-25 RX ORDER — ASPIRIN 325 MG
325 TABLET ORAL
Status: COMPLETED | OUTPATIENT
Start: 2023-07-25 | End: 2023-07-25

## 2023-07-25 RX ADMIN — ASPIRIN 325 MG: 325 TABLET ORAL at 02:07

## 2023-07-25 NOTE — ED PROVIDER NOTES
Encounter Date: 7/25/2023       History     Chief Complaint   Patient presents with    Chest Pain     Pt presents to ed complaining of new onset of left sided chest pain going into his arm and neck. Pt states it feels like numbness. Was seen here a week ago for CVA rule out. NIH 0     43-year-old male presents emergency department with chest pain and paresthesias.  Patient says this morning he started with some paresthesias to the left side of his face some numbness and tingling.  Says it progressed to be some pain in his left chest.  Describes it as aching type pain in his left chest.  He says his left arm felt a little funny as well maybe a little heavy.  He says that he currently does not have any numbness and tingling to his left face but mostly feels just some aching in his left chest and describes it as mild-to-moderate.  Has not taken anything for his symptoms today.  Nonexertional.  Not associated with any diaphoresis or any vomiting.  Not associated with any shortness of breath.  Patient says that he was here about 5 days ago and had a workup for stroke and was diagnosed with Bell's palsy and currently is on steroids and Valtrex.  Says the symptoms of the stroke with facial droop has resolved.  Patient says he has a history of borderline cholesterol but no other past medical history.  Does not smoke does not drink.  No history of diabetes or hypertension.    Review of patient's allergies indicates:  No Known Allergies  No past medical history on file.  No past surgical history on file.  No family history on file.     Review of Systems   Constitutional:  Negative for chills and fever.   HENT:  Negative for sore throat.    Respiratory:  Negative for shortness of breath.    Cardiovascular:  Positive for chest pain. Negative for palpitations.   Gastrointestinal:  Negative for abdominal pain, nausea and vomiting.   Genitourinary:  Negative for dysuria.   Musculoskeletal:  Negative for back pain.   Skin:   Negative for rash.   Neurological:  Positive for numbness. Negative for weakness and headaches.   Hematological:  Does not bruise/bleed easily.   All other systems reviewed and are negative.    Physical Exam     Initial Vitals [07/25/23 1115]   BP Pulse Resp Temp SpO2   (!) 140/98 91 18 98.1 °F (36.7 °C) 99 %      MAP       --         Physical Exam    Nursing note and vitals reviewed.  Constitutional: He appears well-developed and well-nourished. He is not diaphoretic. No distress.   HENT:   Head: Normocephalic and atraumatic.   Mouth/Throat: Oropharynx is clear and moist. No oropharyngeal exudate.   Eyes: Conjunctivae and EOM are normal. Pupils are equal, round, and reactive to light.   Neck: No tracheal deviation present.   Cardiovascular:  Normal rate, regular rhythm, normal heart sounds and intact distal pulses.           No murmur heard.  Pulmonary/Chest: Breath sounds normal. No stridor. No respiratory distress. He has no wheezes. He has no rhonchi. He has no rales.   Abdominal: Abdomen is soft. Bowel sounds are normal. He exhibits no distension. There is no abdominal tenderness. There is no rebound and no guarding.   Musculoskeletal:         General: No tenderness or edema. Normal range of motion.     Neurological: He is alert and oriented to person, place, and time. He has normal strength. No cranial nerve deficit or sensory deficit. GCS score is 15. GCS eye subscore is 4. GCS verbal subscore is 5. GCS motor subscore is 6.   Skin: Skin is warm and dry. Capillary refill takes less than 2 seconds. No rash noted. No erythema. No pallor.   Psychiatric: He has a normal mood and affect. His behavior is normal. Thought content normal.       ED Course   Procedures  Labs Reviewed   CBC W/ AUTO DIFFERENTIAL - Abnormal; Notable for the following components:       Result Value    Immature Granulocytes 0.7 (*)     Immature Grans (Abs) 0.05 (*)     Mono # 0.2 (*)     Gran % 82.6 (*)     Lymph % 13.2 (*)     Mono % 2.8 (*)      All other components within normal limits   COMPREHENSIVE METABOLIC PANEL - Abnormal; Notable for the following components:    Glucose 124 (*)     Alkaline Phosphatase 49 (*)     Anion Gap 7 (*)     All other components within normal limits   MAGNESIUM   TROPONIN I HIGH SENSITIVITY   TROPONIN I HIGH SENSITIVITY   B-TYPE NATRIURETIC PEPTIDE          Results for orders placed or performed during the hospital encounter of 07/25/23   Magnesium   Result Value Ref Range    Magnesium 2.1 1.6 - 2.6 mg/dL   CBC auto differential   Result Value Ref Range    WBC 7.51 3.90 - 12.70 K/uL    RBC 4.93 4.60 - 6.20 M/uL    Hemoglobin 14.1 14.0 - 18.0 g/dL    Hematocrit 41.6 40.0 - 54.0 %    MCV 84 82 - 98 fL    MCH 28.6 27.0 - 31.0 pg    MCHC 33.9 32.0 - 36.0 g/dL    RDW 13.4 11.5 - 14.5 %    Platelets 243 150 - 450 K/uL    MPV 10.9 9.2 - 12.9 fL    Immature Granulocytes 0.7 (H) 0.0 - 0.5 %    Gran # (ANC) 6.2 1.8 - 7.7 K/uL    Immature Grans (Abs) 0.05 (H) 0.00 - 0.04 K/uL    Lymph # 1.0 1.0 - 4.8 K/uL    Mono # 0.2 (L) 0.3 - 1.0 K/uL    Eos # 0.0 0.0 - 0.5 K/uL    Baso # 0.02 0.00 - 0.20 K/uL    nRBC 0 0 /100 WBC    Gran % 82.6 (H) 38.0 - 73.0 %    Lymph % 13.2 (L) 18.0 - 48.0 %    Mono % 2.8 (L) 4.0 - 15.0 %    Eosinophil % 0.4 0.0 - 8.0 %    Basophil % 0.3 0.0 - 1.9 %    Differential Method Automated    Comprehensive metabolic panel   Result Value Ref Range    Sodium 138 136 - 145 mmol/L    Potassium 4.3 3.5 - 5.1 mmol/L    Chloride 105 95 - 110 mmol/L    CO2 26 23 - 29 mmol/L    Glucose 124 (H) 70 - 110 mg/dL    BUN 11 6 - 20 mg/dL    Creatinine 1.1 0.5 - 1.4 mg/dL    Calcium 8.9 8.7 - 10.5 mg/dL    Total Protein 7.0 6.0 - 8.4 g/dL    Albumin 4.1 3.5 - 5.2 g/dL    Total Bilirubin 1.0 0.1 - 1.0 mg/dL    Alkaline Phosphatase 49 (L) 55 - 135 U/L    AST 16 10 - 40 U/L    ALT 14 10 - 44 U/L    eGFR >60.0 >60 mL/min/1.73 m^2    Anion Gap 7 (L) 8 - 16 mmol/L   Troponin I High Sensitivity #1   Result Value Ref Range    Troponin I  High Sensitivity <2.3 0.0 - 14.9 pg/mL   Troponin I High Sensitivity #2   Result Value Ref Range    Troponin I High Sensitivity <2.3 0.0 - 14.9 pg/mL   B-Type natriuretic peptide (BNP)   Result Value Ref Range    BNP 9 0 - 99 pg/mL     CT Head Without Contrast   Final Result      X-Ray Chest AP Portable   Final Result          Imaging Results              CT Head Without Contrast (Final result)  Result time 07/25/23 12:27:59      Final result by Lindsey Dunlap MD (07/25/23 12:27:59)                   Narrative:    All CT scans at this facility used dose modulation, iterative reconstruction and/or weight-based dosing when appropriate to reduce radiation doses  as low as reasonably achievable.      COMPARISON: 7/19/2023  CLINICAL INFORMATION:  Neuro deficit, acute, stroke suspected    FINDINGS:   The ventricles and sulci are normal in size and configuration for age.  There is no intraparenchymal hemorrhage, mass or midline shift.  There are no extra-axial fluid collections.  The paranasal sinus and mastoid air cells are clear.    IMPRESSION:   NO ACUTE INTRACRANIAL PROCESS.      Electronically signed by:  Lindsey Dunlap MD  7/25/2023 12:27 PM CDT Workstation: [id number]                                     X-Ray Chest AP Portable (Final result)  Result time 07/25/23 12:08:59      Final result by Lindsey Dunlap MD (07/25/23 12:08:59)                   Narrative:    XR CHEST 1 VIEW    CLINICAL HISTORY:  43 years Male Chest Pain    COMPARISON: 7/19/2023    FINDINGS: Cardiomediastinal silhouette is within normal limits. Lungs are normally expanded with no airspace consolidation. No pleural effusion or pneumothorax. No acute osseous abnormality.    IMPRESSION: No acute pulmonary process.    Electronically signed by:  Lindsey Dunlap MD  7/25/2023 12:08 PM CDT Workstation: [id number]                                     Medications   aspirin tablet 325 mg (325 mg Oral Given 7/25/23 1419)     Medical Decision  Making:   Differential Diagnosis:   Includes but not limited to chest pain, chest wall pain, PE, pneumonia, pneumothorax, dissection, pericardial tamponade  Clinical Tests:   Lab Tests: Ordered and Reviewed  Radiological Study: Ordered and Reviewed  Medical Tests: Ordered and Reviewed  ED Management:  Emergent evaluation 43-year-old male presents emergency department chest pain as described above.  Patient emergency department evaluated he is well-appearing, nontoxic, no distress.  He has had 2- troponins emergency department.  His BNP is normal.  His electrolytes are normal, his CBC is normal.  I do not suspect ACS,( 2 troponins and ekg negative, Low risk HEART score) do not suspect Aortic Dissection ( pain is not ripping or tearing, normal pulses 4 extremities, no focal neuro deficits, normal mediastinum on CXR, low pretest probability, Non-Marfanoid) PE ( Low Wells score, perc negative low pretest probability) Esophageal rupture ( CXR normal, no Hamman's crunch) Pneumonia (CXR normal, no cough, fever) Pericarditis with tamponade (VS normal, no JVD, hypotension, murmur, ekg within normal limits) Pneumothorax (Chest X ray negative).  Patient has plans for outpatient stress testing and Cardiology follow-up and primary care provider follow-up.  Patient will return if symptoms change or worsen.    A dictation software program was used for this note.  Please expect some simple typographical  errors in this note.    I had a detailed discussion with the patient and/or guardian regarding: The historical points, exam findings, and diagnostic results supporting the discharge diagnosis, lab results, pertinent radiology results, and the need for outpatient follow-up, for definitive care with a family practitioner, cardiology and to return to the emergency department if symptoms worsen or persist or if there are any questions or concerns that arise at home. All questions have been answered in detail. Strict return to Emergency  Department precautions have been provided         Additional MDM:   PERC Rule:   Age is greater than or equal to 50 = 0.0  Heart Rate is greater than or equal to 100 = 0.0  SaO2 on room air < 95% = 0.0  Unilateral leg swelling = 0.0  Hemoptysis = 0.0  Recent surgery or trauma = 0.0  Prior PE or DVT =  0.0  Hormone use = 0.00  PERC Score = 0  Heart Score:    History:          Slightly suspicious.  ECG:             Normal  Age:               Less than 45 years  Risk factors: 1-2 risk factors  Troponin:       Less than or equal to normal limit  Final Score: 1          ED Course as of 07/25/23 1714   Tue Jul 25, 2023   1314 EKG 11:18 normal sinus rhythm rate of 88.  No ST elevation or depression.  No evidence of ischemia.  Normal intervals.  No STEMI.  EKG interpreted independently me [JR]   1347 Patient reassess, is feeling better. [JR]      ED Course User Index  [JR] Pritesh Hickey DO                 Clinical Impression:   Final diagnoses:  [R07.9] Chest pain (Primary)  [R20.2] Paresthesias        ED Disposition Condition    Discharge Stable          ED Prescriptions    None       Follow-up Information       Follow up With Specialties Details Why Contact Info Additional Information    [facility] - Emergency Dept Emergency Medicine  If symptoms worsen [address] [id number] [phone] 1st floor    Inderjit Danielle MD Interventional Cardiology, Cardiology In 3 days  [address]  [address]  [facility] [address]  [phone]                Pritesh Hickey DO  07/25/23 8650

## 2023-07-25 NOTE — DISCHARGE INSTRUCTIONS
RETURN TO EMERGENCY DEPARTMENT WITHOUT FAIL, IF YOUR SYMPTOMS WORSEN, IF YOU GET NEW OR DIFFERENT SYMPTOMS, IF YOU ARE UNABLE TO FOLLOW UP AS DIRECTED, OR IF YOU HAVE ANY CONCERNS OR WORRIES.    Follow-up with Cardiology and primary care an outpatient basis.